# Patient Record
Sex: FEMALE | Race: WHITE | NOT HISPANIC OR LATINO | URBAN - METROPOLITAN AREA
[De-identification: names, ages, dates, MRNs, and addresses within clinical notes are randomized per-mention and may not be internally consistent; named-entity substitution may affect disease eponyms.]

---

## 2022-09-02 ENCOUNTER — EMERGENCY (EMERGENCY)
Facility: HOSPITAL | Age: 77
LOS: 0 days | Discharge: HOME | End: 2022-09-02
Attending: STUDENT IN AN ORGANIZED HEALTH CARE EDUCATION/TRAINING PROGRAM
Payer: MEDICARE

## 2022-09-02 VITALS
HEART RATE: 93 BPM | SYSTOLIC BLOOD PRESSURE: 170 MMHG | OXYGEN SATURATION: 97 % | RESPIRATION RATE: 20 BRPM | DIASTOLIC BLOOD PRESSURE: 90 MMHG | TEMPERATURE: 99 F

## 2022-09-02 VITALS
SYSTOLIC BLOOD PRESSURE: 158 MMHG | TEMPERATURE: 99 F | OXYGEN SATURATION: 99 % | HEART RATE: 90 BPM | RESPIRATION RATE: 18 BRPM | DIASTOLIC BLOOD PRESSURE: 86 MMHG
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
## 2022-09-02 DIAGNOSIS — Z23 ENCOUNTER FOR IMMUNIZATION: ICD-10-CM

## 2022-09-02 DIAGNOSIS — S61.431A PUNCTURE WOUND WITHOUT FOREIGN BODY OF RIGHT HAND, INITIAL ENCOUNTER: ICD-10-CM

## 2022-09-02 DIAGNOSIS — S41.102A UNSPECIFIED OPEN WOUND OF LEFT UPPER ARM, INITIAL ENCOUNTER: ICD-10-CM
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
## 2022-09-02 DIAGNOSIS — S41.112A LACERATION WITHOUT FOREIGN BODY OF LEFT UPPER ARM, INITIAL ENCOUNTER: ICD-10-CM

## 2022-09-02 PROCEDURE — 99281 EMR DPT VST MAYX REQ PHY/QHP: CPT

## 2022-09-02 PROCEDURE — 99284 EMERGENCY DEPT VISIT MOD MDM: CPT | Mod: FS

## 2022-09-02 PROCEDURE — 12004 RPR S/N/AX/GEN/TRK7.6-12.5CM: CPT

## 2022-09-02 PROCEDURE — 73130 X-RAY EXAM OF HAND: CPT | Mod: 26,RT

## 2022-09-02 PROCEDURE — 99284 EMERGENCY DEPT VISIT MOD MDM: CPT | Mod: 25

## 2022-09-02 PROCEDURE — 73130 X-RAY EXAM OF HAND: CPT | Mod: RT

## 2022-09-02 PROCEDURE — 73060 X-RAY EXAM OF HUMERUS: CPT | Mod: 26,LT

## 2022-09-02 PROCEDURE — 73060 X-RAY EXAM OF HUMERUS: CPT | Mod: LT

## 2022-09-02 PROCEDURE — 90715 TDAP VACCINE 7 YRS/> IM: CPT

## 2022-09-02 RX ORDER — ACETAMINOPHEN 500 MG
650 TABLET ORAL ONCE
Refills: 0 | Status: COMPLETED | OUTPATIENT
Start: 2022-09-02 | End: 2022-09-02

## 2022-09-02 RX ORDER — TETANUS TOXOID, REDUCED DIPHTHERIA TOXOID AND ACELLULAR PERTUSSIS VACCINE, ADSORBED 5; 2.5; 8; 8; 2.5 [IU]/.5ML; [IU]/.5ML; UG/.5ML; UG/.5ML; UG/.5ML
0.5 SUSPENSION INTRAMUSCULAR ONCE
Refills: 0 | Status: COMPLETED | OUTPATIENT
Start: 2022-09-02 | End: 2022-09-02

## 2022-09-02 RX ADMIN — Medication 650 MILLIGRAM(S): at 20:28

## 2022-09-02 RX ADMIN — Medication 1 TABLET(S): at 20:46

## 2022-09-02 RX ADMIN — TETANUS TOXOID, REDUCED DIPHTHERIA TOXOID AND ACELLULAR PERTUSSIS VACCINE, ADSORBED 0.5 MILLILITER(S): 5; 2.5; 8; 8; 2.5 SUSPENSION INTRAMUSCULAR at 20:28

## 2022-09-02 NOTE — ED STATDOCS - ATTENDING APP SHARED VISIT CONTRIBUTION OF CARE
I, Carlito Saldana MD,  performed the initial face to face bedside interview with this patient regarding history of present illness, review of symptoms and relevant past medical, social and family history.  I completed an independent physical examination.  I was the initial provider who evaluated this patient.   I personally saw the patient and performed a substantive portion of the visit including all aspects of the medical decision making.  I have signed out the follow up of any pending tests (i.e. labs, radiological studies) to the EVETTE.  I have communicated the patient’s plan of care and disposition with the EVETTE.  The history, relevant review of systems, past medical and surgical history, medical decision making, and physical examination was documented by the scribe in my presence and I attest to the accuracy of the documentation.

## 2022-09-02 NOTE — ED STATDOCS - PROGRESS NOTE DETAILS
75 y/o female presents to ED c/o getting bit by niece's dog while sitting outside.  Dog was on leash, and came near her and bit her arm.  Pt apparently fainted after the incident.  Neg head strike, nausea, vomiting, neg neck pain.  Cut to left arm and small puncture wounds to right hand.  Desirae Burgess PA-C

## 2022-09-02 NOTE — ED STATDOCS - CARE PROVIDER_API CALL
Guevara Dc)  Orthopaedic Surgery; Surgery of the Hand  [address] [place] [address]  Phone: [phone]  Fax: [phone]  Follow Up Time:

## 2022-09-02 NOTE — ED STATDOCS - CLINICAL SUMMARY MEDICAL DECISION MAKING FREE TEXT BOX
pt here after dog bite. has large open laceration with no evidence of neurovascular compromise or muscle damage. no other evidence of trauma. dog is UTD with shots. will plan to give tetanus, call hand surgery, provide antibiotics and likely do loose approx of wound pending discussion with specialist.

## 2022-09-02 NOTE — ED STATDOCS - WR INTERPRETATION 1
Intermediate Repair Preamble Text (Leave Blank If You Do Not Want): Undermining was performed with blunt dissection. Neg fb, wilmer palencia PA-C

## 2022-09-02 NOTE — ED STATDOCS - PHYSICAL EXAMINATION
Constitutional: Awake, Alert, non-toxic. No acute distress. Well appearing, well nourished.   HEAD: Normocephalic, atraumatic.   EYES: PERRL, EOM intact, conjunctiva and sclera are clear bilaterally. No raccoon eyes.   ENT: External ears normal. No rhinorrhea, no tracheal deviation   NECK: Supple, non-tender  CARDIOVASCULAR: regular rate and rhythm.  RESPIRATORY: Normal respiratory effort; breath sounds CTAB, no wheezes, rhonchi, or rales. Speaking in full sentences. No accessory muscle use.   ABDOMEN: Soft; non-tender, non-distended. No rebound or guarding.   EXTREMITIES:  no lower extremity edema, no deformities  SKIN: left medial bicep area with 10cm open laceration with no active bleeding, subcutaneous tissue present, no evidence of fascia present, no surrounding puncture wounds to that area. right hand with two small puncture wounds  NEURO: A&O x3. Sensory and motor functions are grossly intact. Speech is normal. No facial droop. left UE elbow flexion and extension with intact strength, left wrist extension and flexion intact strength, sensory intact to light touch over entire left arm. intact radial and ulnar pulse.   PSYCH: Appearance and judgement seem appropriate for gender and age.

## 2022-09-02 NOTE — CONSULT NOTE ADULT - SUBJECTIVE AND OBJECTIVE BOX
76y Female presents c/o Left Arm injury affecting the upper arm volar aspect sustaining a large laceration extending horizontally into the subcutaneous tissues.  Pt was bitten by the family dog. Pt from [place] here visiting family. The incident occurred at dinner table in their home approx 1700h and Family assisted her after the injury. No fall or head strike. She was also bitten on the right hand but denies any pain or loss of motion. After the injury the pt had immediate pain with no alleviating factors. They came to the ED. No imaging was obtained as of yet per the ED. Denies HS/LOC. Denies numbness/tingling. No other pain/injuries. Right Hand dominant. The Orthopedic Hand Surgeon leandro was contacted by the ED and we are asked to see the pt on their behalf. Pt was seen immediately felt to have +palpable radial pulse. The wound wrappings were taken down, wound as inspected without any obvious foreign body, and copiously irrigated with 1L diluted betadine then 2L NS at the bedside and new sterile dressing applied. Pt taken back for xrays. Xray  no obviuos bony injury.    HEALTH ISSUES - PROBLEM Dx:        MEDICATIONS  (STANDING):  acetaminophen     Tablet .. 650 milliGRAM(s) Oral once  diphtheria/tetanus/pertussis (acellular) Vaccine (ADAcel) 0.5 milliLiter(s) IntraMuscular once    Allergies    No Known Allergies    Intolerances                  Vital Signs Last 24 Hrs  T(C): 37.4 (09-02-22 @ 18:27), Max: 37.4 (09-02-22 @ 18:27)  T(F): 99.4 (09-02-22 @ 18:27), Max: 99.4 (09-02-22 @ 18:27)  HR: 93 (09-02-22 @ 18:27) (93 - 93)  BP: 170/90 (09-02-22 @ 18:27) (170/90 - 170/90)  BP(mean): --  RR: 20 (09-02-22 @ 18:27) (20 - 20)  SpO2: 97% (09-02-22 @ 18:27) (97% - 97%)    Imaging: No obvious bony injury or fb on xray    Physical Exam  Gen: NAD, Calm, awake and alert, following commands normally.  Head: NCAT, no facial trauma  Neck: Intact AROM, no bony TTP    Left UE: Hand: Obvious injury affecting the upper arm volar aspect with large 7-8cm laceration extending horizontally into the subcutaneous tissues.Pt is able** to actively flex and extend the elbow and the hand at the DIP, PIP, MCP. No bony TTP to the remained of the hand or distal radius, elbow or elsewhere. Compartments of the hand and forearm are soft and compressible, extremity warm/well perfused distally. Able to wiggle unaffected fingers normally. + radial pulse palpable.   RUE: The right hand has some ecchymosis on the dorsum near the 1st MCP. There are small superficial bite marks x3 that are not bleeding. Fingers are painless and full ROM without any bony TTP. Actively Moves shoulder, elbow, wrist, digits painlessly at baseline. No deformity or swelling.   Bilat LE: Ambulatory in the ED without issue. No swelling or deformity. Painless baseline APROM of hips, knees, ankles, toes. Able to actively SLR.  Negative HS/Log roll. 2+ DP pulses.     Procedure: Above irrigation as per HPI. Subsequently Ortho Residents advised by DR Dc to perform local anesthetic using 1% lidocaine followed by suture wound closure and sterile bandage.    A/P: 76y Female with Left Upper arm dog bite wound/ large 7-8cm laceration s/p irrigation and now suture repair in ED.    Tetanus in ED ordered  Antibiotics  NWB Left UE keep c/d/I,   Elevation of hand and encouraged  Emphasized keeping the bandage dry and not to remove until office visit  Si/sx compartment syndrome discussed with patient, told to return to ED if exhibit any  Follow up with Dr. Dc within 3-5 days, call office for appointment   otherwise if returns to [place] MUST follow ASAP 3-5 days with surgeon there and she understands this and seems reliable  Plan relayed to ED physician EDWINA Hung stable for DC after suture repair completed  Above as directed by the orthopedic Hand Attending DR Dao reeves. 76y Female presents c/o Left Arm injury affecting the upper arm volar aspect sustaining a large laceration extending horizontally from the bicep area to the tricep area deep into the subcutaneous tissues.  Pt was bitten by friend's family dog. Pt from [place] here visiting family. The incident occurred at dinner table in their home approx 1700h and Family assisted her after the injury. No fall or head strike. She also sustained a small bite on the right hand but denies any pain or loss of motion. After the injury the pt had immediate pain with no alleviating factors. They came to the ED.  Denies HS/LOC. Denies numbness/tingling. No other pain/injuries. Right Hand dominant. The Orthopedic Hand Surgeon leandro was contacted by the ED and we are asked to see the pt on their behalf. Pt was seen immediately felt to have +palpable radial pulse. The wound wrappings were taken down, wound as inspected without any obvious foreign body, and copiously irrigated with 1L diluted betadine then 2L NS at the bedside and new sterile dressing applied. Pt taken back for xrays. Xray no obviuos bony injury.    HEALTH ISSUES - PROBLEM Dx:    PAST MEDICAL & SURGICAL HISTORY:  none    Social History:  Healthy active non smoker    Family Hx:  No pertinent family hx    MEDICATIONS  (STANDING):  acetaminophen     Tablet .. 650 milliGRAM(s) Oral once  diphtheria/tetanus/pertussis (acellular) Vaccine (ADAcel) 0.5 milliLiter(s) IntraMuscular once    Allergies    No Known Allergies    Intolerances                  Vital Signs Last 24 Hrs  T(C): 37.4 (09-02-22 @ 18:27), Max: 37.4 (09-02-22 @ 18:27)  T(F): 99.4 (09-02-22 @ 18:27), Max: 99.4 (09-02-22 @ 18:27)  HR: 93 (09-02-22 @ 18:27) (93 - 93)  BP: 170/90 (09-02-22 @ 18:27) (170/90 - 170/90)  BP(mean): --  RR: 20 (09-02-22 @ 18:27) (20 - 20)  SpO2: 97% (09-02-22 @ 18:27) (97% - 97%)    Imaging: No obvious bony injury or fb on xray    Physical Exam  Gen: NAD, Calm, awake and alert, following commands normally.  Head: NCAT, no facial trauma  Neck: Intact AROM, no bony TTP    Left UE: Hand: Obvious injury affecting the upper arm volar aspect with large 7-8cm laceration extending horizontally into the subcutaneous tissues.Pt is able** to actively flex and extend the elbow and the hand at the DIP, PIP, MCP. No bony TTP to the remained of the hand or distal radius, elbow or elsewhere. Compartments of the hand and forearm are soft and compressible, extremity warm/well perfused distally. Able to wiggle unaffected fingers normally. + radial pulse palpable.   RUE: The right hand has some ecchymosis on the dorsum near the 1st MCP. There are small superficial bite marks x3 that are not bleeding. Fingers are painless and full ROM without any bony TTP. Actively Moves shoulder, elbow, wrist, digits painlessly at baseline. No deformity or swelling.   Bilat LE: Ambulatory in the ED without issue. No swelling or deformity. Painless baseline APROM of hips, knees, ankles, toes. Able to actively SLR.  Negative HS/Log roll. 2+ DP pulses.     Procedure: Above irrigation as per HPI. Subsequently Ortho Residents advised by DR Dc to perform local anesthetic using 1% lidocaine followed by suture wound closure and sterile bandage.    A/P: 76y Female with Left Upper arm dog bite wound/ large 7-8cm laceration s/p irrigation and now suture repair in ED.    Tetanus in ED ordered  Antibiotics  NWB Left UE keep c/d/I,   Elevation of hand and encouraged  Emphasized keeping the bandage dry and not to remove until office visit  Si/sx compartment syndrome discussed with patient, told to return to ED if exhibit any  Follow up with Dr. Dc within 3-5 days, call office for appointment   otherwise if returns to [place] MUST follow ASAP 3-5 days with surgeon there and she understands this and seems reliable  Plan relayed to ED physician EDWINA contreras for DC after suture repair completed  Above as directed by the orthopedic Hand Attending DR Dao reeves. 76y Female presents c/o Left Arm injury affecting the upper arm volar aspect sustaining a large laceration extending horizontally from the bicep area to the tricep area deep into the subcutaneous tissues.  Pt was bitten by friend's family dog. Pt from [place] here visiting family. The incident occurred at dinner table in their home approx 1700h and Family assisted her after the injury. No fall or head strike. She also sustained a small bite on the right hand but denies any pain or loss of motion. After the injury the pt had immediate pain with no alleviating factors. They came to the ED.  Denies HS/LOC. Denies numbness/tingling. No other pain/injuries. Right Hand dominant. The Orthopedic Hand Surgeon leandro was contacted by the ED and we are asked to see the pt on their behalf. Pt was seen immediately felt to have +palpable radial pulse. The wound wrappings were taken down, wound as inspected without any obvious foreign body, and copiously irrigated with 1L diluted betadine then 2L NS at the bedside and new sterile dressing applied. Pt taken back for xrays. Xray no obviuos bony injury.    HEALTH ISSUES - PROBLEM Dx:    PAST MEDICAL & SURGICAL HISTORY:  none    Social History:  Healthy active non smoker    Family Hx:  No pertinent family hx    MEDICATIONS  (STANDING):  acetaminophen     Tablet .. 650 milliGRAM(s) Oral once  diphtheria/tetanus/pertussis (acellular) Vaccine (ADAcel) 0.5 milliLiter(s) IntraMuscular once    Allergies    No Known Allergies    Intolerances                  Vital Signs Last 24 Hrs  T(C): 37.4 (09-02-22 @ 18:27), Max: 37.4 (09-02-22 @ 18:27)  T(F): 99.4 (09-02-22 @ 18:27), Max: 99.4 (09-02-22 @ 18:27)  HR: 93 (09-02-22 @ 18:27) (93 - 93)  BP: 170/90 (09-02-22 @ 18:27) (170/90 - 170/90)  BP(mean): --  RR: 20 (09-02-22 @ 18:27) (20 - 20)  SpO2: 97% (09-02-22 @ 18:27) (97% - 97%)    Imaging: No obvious bony injury or fb on xray    Physical Exam  Gen: NAD, Calm, awake and alert, following commands normally.  Head: NCAT, no facial trauma  Neck: Intact AROM, no bony TTP    Left UE: Hand: Obvious injury affecting the upper arm volar aspect with large 7-8cm laceration extending horizontally into the subcutaneous tissues.Pt is able to actively flex and extend the elbow and the hand at the DIP, PIP, MCP. No bony TTP to the remained of the hand or distal radius, elbow or elsewhere. Compartments of the hand and forearm are soft and compressible, extremity warm/well perfused distally. Able to wiggle unaffected fingers normally. + radial pulse palpable.   RUE: The right hand has some ecchymosis on the dorsum near the 1st MCP. There are small superficial bite marks x3 that are not bleeding. Fingers are painless and full ROM without any bony TTP. Actively Moves shoulder, elbow, wrist, digits painlessly at baseline. No deformity or swelling.   Bilat LE: Ambulatory in the ED without issue. No swelling or deformity. Painless baseline APROM of hips, knees, ankles, toes. Able to actively SLR.  Negative HS/Log roll. 2+ DP pulses.     Procedure: Risks and benefits of the procedure were discussed with the patient. The patient provided verbal consent and wished to proceed with the laceration repair. Above irrigation with 3L of NS and dilute betadine as per HPI. 10 mL of 1% lidocaine was injected around the laceration site after cleaning with alcohol. The area was then cleaned with betadine and the extremity was draped in sterile fashion. The wound was further irrigated and explored, with no significant findings. The laceration was then repaired with 3-0 prolene. The wound was then dressed with bacitracin, xeroform, gauze, kerlix. Neurovascularly intact after the procedure. The patient tolerated the procedure well.     A/P: 76y Female with Left Upper arm dog bite wound/ large 7-8cm laceration s/p irrigation and now suture repair in ED.    Tetanus in ED ordered  Antibiotics  NWB Left UE keep c/d/I,   Elevation of hand and encouraged  Emphasized keeping the bandage dry and not to remove until office visit  Si/sx compartment syndrome discussed with patient, told to return to ED if exhibit any  Follow up with Dr. Dc within 3-5 days, call office for appointment   otherwise if returns to [place] MUST follow ASAP 3-5 days with surgeon there and she understands this and seems reliable  Plan relayed to ED physician EDWINA contreras for DC after suture repair completed  Above as directed by the orthopedic Hand Attending DR Dao reeves.

## 2022-09-02 NOTE — ED STATDOCS - CARE PLAN
1 Principal Discharge DX:	Laceration of left upper arm  Secondary Diagnosis:	Dog bite  Secondary Diagnosis:	Puncture wound of hand, right

## 2022-09-02 NOTE — ED STATDOCS - NSFOLLOWUPINSTRUCTIONS_ED_ALL_ED_FT
Animal Bite, Adult      Animal bite wounds can be mild or serious. It is important to get medical treatment to prevent infection. Ask your doctor if you need treatment to prevent an infection that can spread from animals to humans (rabies).      Follow these instructions at home:      Wound care    •Follow instructions from your doctor about how to take care of your wound. Make sure you:  •Wash your hands with soap and water before you change your bandage (dressing). If you cannot use soap and water, use hand .      •Change your bandage as told by your doctor.      •Leave stitches (sutures), skin glue, or skin tape (adhesive) strips in place. They may need to stay in place for 2 weeks or longer. If tape strips get loose and curl up, you may trim the loose edges. Do not remove tape strips completely unless your doctor says it is okay.      •Check your wound every day for signs of infection. Check for:  •More redness, swelling, or pain.      •More fluid or blood.      •Warmth.      •Pus or a bad smell.        Medicines     •Take or apply over-the-counter and prescription medicines only as told by your doctor.      •If you were prescribed an antibiotic, take or apply it as told by your doctor. Do not stop using the antibiotic even if your wound gets better.        General instructions      •Keep the injured area raised (elevated) above the level of your heart while you are sitting or lying down.    •If directed, put ice on the injured area.  •Put ice in a plastic bag.      •Place a towel between your skin and the bag.      •Leave the ice on for 20 minutes, 2–3 times per day.        •Keep all follow-up visits as told by your doctor. This is important.        Contact a doctor if:    •You have more redness, swelling, or pain around your wound.      •Your wound feels warm to the touch.      •You have a fever or chills.      •You have a general feeling of sickness (malaise).      •You feel sick to your stomach (nauseous).      •You throw up (vomit).      •You have pain that does not get better.        Get help right away if:    •You have a red streak going away from your wound.    •You have any of these coming from your wound:  •Non-clear fluid.      •More blood.      •Pus or a bad smell.        •You have trouble moving your injured area.      •You lose feeling (have numbness) or feel tingling anywhere on your body.        Summary    •It is important to get the right medical treatment for animal bites. Treatment can help you to not get an infection. Ask your doctor if you need treatment to prevent an infection that can spread from animals to humans (rabies).      •Check your wound every day for signs of infection, such as more redness or swelling instead of less.      •If you have a red streak going away from your wound, get medical help right away.      This information is not intended to replace advice given to you by your health care provider. Make sure you discuss any questions you have with your health care provider.      Document Revised: 10/12/2021 Document Reviewed: 10/12/2021    Elsevier Patient Education © 2022 [employer]

## 2022-09-02 NOTE — ED STATDOCS - NS ED ATTENDING STATEMENT MOD
This was a shared visit with the EVETTE. I reviewed and verified the documentation and independently performed the documented:

## 2022-09-02 NOTE — ED STATDOCS - OBJECTIVE STATEMENT
76 year old female with PMHx of c/o wound caused by dog bite to left bicep x 30 minutes PTA. Pt was attacked (unsure if bit or scratched) by cousin's daughter's dog. Dog's vaccines are UTD. Unsure of when last tetanus was. Notes burning and stinging sensation to bite site. Denies numbness or loss of feeling. Not on any daily medications. NKDA. No other injuries or complaints.

## 2022-09-02 NOTE — ED STATDOCS - PATIENT PORTAL LINK FT
You can access the FollowMyHealth Patient Portal offered by [facility] by registering at the following website: [email or web address]. By joining [employer]’s FollowMyHealth portal, you will also be able to view your health information using other applications (apps) compatible with our system.

## 2022-09-02 NOTE — ED STATDOCS - NS ED ROS FT
Constitutional: negative for fevers/chills  HENT: no hearing problems, sore throat, nasal congestion  Eyes: no visual disturbances  Neck: no neck stiffness or neck pain  Cardiovascular: no chest pain, no palpitations, no edema  Respiratory: no cough, no shortness of breath  Musculoskeletal: no back pain, no pain of extremities  Gastrointestinal: no abdominal pain, nausea, vomiting  : no dysuria or hematuria or polyuria  Neuro: no headaches, no numbness or tingling, no dizziness  Skin: no rashes, +wound
